# Patient Record
Sex: MALE | Race: WHITE | NOT HISPANIC OR LATINO | URBAN - METROPOLITAN AREA
[De-identification: names, ages, dates, MRNs, and addresses within clinical notes are randomized per-mention and may not be internally consistent; named-entity substitution may affect disease eponyms.]

---

## 2024-11-21 ENCOUNTER — OFFICE VISIT (OUTPATIENT)
Dept: SURGERY | Facility: CLINIC | Age: 54
End: 2024-11-21
Payer: COMMERCIAL

## 2024-11-21 VITALS — DIASTOLIC BLOOD PRESSURE: 88 MMHG | OXYGEN SATURATION: 98 % | SYSTOLIC BLOOD PRESSURE: 128 MMHG | HEART RATE: 57 BPM

## 2024-11-21 DIAGNOSIS — K40.20 NON-RECURRENT BILATERAL INGUINAL HERNIA WITHOUT OBSTRUCTION OR GANGRENE: Primary | ICD-10-CM

## 2024-11-21 PROCEDURE — 99213 OFFICE O/P EST LOW 20 MIN: CPT | Performed by: SURGERY

## 2024-11-21 PROCEDURE — 1036F TOBACCO NON-USER: CPT | Performed by: SURGERY

## 2024-11-21 RX ORDER — CANDESARTAN 32 MG/1
TABLET ORAL
COMMUNITY
Start: 2021-02-25

## 2024-11-21 RX ORDER — METOPROLOL SUCCINATE 25 MG/1
25 TABLET, EXTENDED RELEASE ORAL
COMMUNITY
Start: 2021-02-25

## 2024-11-21 RX ORDER — TRAMADOL HYDROCHLORIDE 50 MG/1
TABLET ORAL
COMMUNITY

## 2024-11-21 RX ORDER — ATORVASTATIN CALCIUM 20 MG/1
TABLET, FILM COATED ORAL
COMMUNITY
Start: 2021-02-25

## 2024-11-21 NOTE — PROGRESS NOTES
Subjective   Patient ID: John Alarcon is a 54 y.o. male who presents for Hernia (L inguinal x a week, pain w/ROM).    HPI the patient developed bulge in the left inguinal area over the last few months.  Gradually increase in size, also complains on the smaller bulge on the right side.  The patient had a previous history of back surgery with anterior approach on the left side.  Review of Systems GI consistent with a bulge in the left inguinal area.  Review of [place] system is negative pupils equal bilaterally, oral mucosa moist, bilateral breath sounds, regular rate, abdomen soft, mild tenderness left lower quadrant.  Palpable reducible large  Physical Exam inguinal hernia.  Scar from previous anterior approach for a back surgery.  Palpable smaller reducible right inguinal hernia.  No focal neurological motor deficits.  Musculoskeletal exam within normal limits.    Objective I reviewed all available data including lab results, radiological studies, previous reports and notes.    No diagnosis found.   There is no problem list on file for this patient.     Allergies   Allergen Reactions    Levofloxacin Dizziness, GI Upset and Nausea Only    Cephalosporins Rash     keflex   diarrhea, stomach cramps    keflex    Gabapentin Rash     impotnece and bladder dysfunction    neurontin urology problems    Hydrochlorothiazide Palpitations      Medication Documentation Review Audit       Reviewed by Easton Morris MD (Physician) on 11/21/24 at 1046      Medication Order Taking? Sig Documenting Provider Last Dose Status   atorvastatin (Lipitor) 20 mg tablet [id number] Yes Take by mouth. Historical Provider, MD  Active   candesartan (Atacand) 32 mg tablet [id number] Yes Take by mouth. Historical Provider, MD  Active   metoprolol succinate XL (Toprol-XL) 25 mg 24 hr tablet [id number] Yes Take 1 tablet (25 mg) by mouth once daily. Historical Provider, MD  Active   traMADol (Ultram) 50 mg tablet [id number]  TAKE 1 TO 2 TABLETS BY  MOUTH EVERY 6 TO 8 HOURS AS NEEDED FOR PAIN Historical Provider, MD  Active                    Past Medical History:   Diagnosis Date    Personal history of other diseases of the circulatory system     History of hypertension    Personal history of other diseases of the musculoskeletal system and connective tissue     History of low back pain    Personal history of other endocrine, nutritional and metabolic disease     History of hyperlipidemia     Social History     Tobacco Use   Smoking Status Never   Smokeless Tobacco Never     No family history on file.   Past Surgical History:   Procedure Laterality Date    APPENDECTOMY  11/30/2013    Appendectomy    OTHER SURGICAL HISTORY  11/30/2013    Arthrodesis Lumbar L___       Assessment/Plan   Patient with bilateral inguinal hernia.  Previous history of anterior approach for spinal surgery.  The patient has indication for robotic, probable open bilateral inguinal repair.  The final decision about robotic versus open depends on amount of intra-abdominal adhesions on the previous anterior approach.  Risks, benefits, alternative treatment were explained to the patient.  All questions were answered.  Informed consent was obtained.    Easton Morris MD

## 2024-11-27 LAB
NON-UH HIE BUN/CREAT RATIO: 14
NON-UH HIE BUN: 14 MG/DL (ref 9–23)
NON-UH HIE CALCIUM: 9.8 MG/DL (ref 8.7–10.4)
NON-UH HIE CALCULATED OSMOLALITY: 277 MOSM/KG (ref 275–295)
NON-UH HIE CHLORIDE: 105 MMOL/L (ref 98–107)
NON-UH HIE CO2, VENOUS: 31 MMOL/L (ref 20–31)
NON-UH HIE CREATININE: 1 MG/DL (ref 0.6–1.1)
NON-UH HIE GFR AA: >60
NON-UH HIE GLOMERULAR FILTRATION RATE: >60 ML/MIN/1.73M?
NON-UH HIE GLUCOSE: 71 MG/DL (ref 74–106)
NON-UH HIE HCT: 45.2 % (ref 41–52)
NON-UH HIE HGB: 15.2 G/DL (ref 13.5–17.5)
NON-UH HIE INSTR WBC ND: 7
NON-UH HIE K: 4.5 MMOL/L (ref 3.5–5.1)
NON-UH HIE MCH: 29.1 PG (ref 27–34)
NON-UH HIE MCHC: 33.7 G/DL (ref 32–37)
NON-UH HIE MCV: 86.4 FL (ref 80–100)
NON-UH HIE MPV: 9.1 FL (ref 7.4–10.4)
NON-UH HIE NA: 139 MMOL/L (ref 135–145)
NON-UH HIE PLATELET: 201 X10 (ref 150–450)
NON-UH HIE RBC: 5.23 X10 (ref 4.7–6.1)
NON-UH HIE RDW: 13.1 % (ref 11.5–14.5)
NON-UH HIE WBC: 7 X10 (ref 4.5–11)

## 2024-12-30 ENCOUNTER — APPOINTMENT (OUTPATIENT)
Dept: SURGERY | Facility: CLINIC | Age: 54
End: 2024-12-30
Payer: COMMERCIAL

## 2024-12-30 DIAGNOSIS — K40.20 NON-RECURRENT BILATERAL INGUINAL HERNIA WITHOUT OBSTRUCTION OR GANGRENE: Primary | ICD-10-CM

## 2024-12-30 PROCEDURE — 99024 POSTOP FOLLOW-UP VISIT: CPT | Performed by: SURGERY

## 2024-12-30 PROCEDURE — 1036F TOBACCO NON-USER: CPT | Performed by: SURGERY

## 2024-12-30 NOTE — PROGRESS NOTES
Subjective   Patient ID: John Alarcon is a 54 y.o. male who presents for Post-op ([facility] 12/4).  No complaints    HPI bilateral robotic inguinal hernia repair  Review of Systems  Physical Exam    Objective     No diagnosis found.   There is no problem list on file for this patient.     Allergies   Allergen Reactions    Levofloxacin Dizziness, GI Upset and Nausea Only    Cephalosporins Rash     keflex   diarrhea, stomach cramps    keflex    Gabapentin Rash     impotnece and bladder dysfunction    neurontin urology problems    Hydrochlorothiazide Palpitations      Medication Documentation Review Audit       Reviewed by Easton Morris MD (Physician) on 12/30/24 at 1135      Medication Order Taking? Sig Documenting Provider Last Dose Status   atorvastatin (Lipitor) 20 mg tablet [id number]  Take by mouth. Historical Provider, MD  Active   candesartan (Atacand) 32 mg tablet [id number]  Take by mouth. Historical Provider, MD  Active   metoprolol succinate XL (Toprol-XL) 25 mg 24 hr tablet [id number]  Take 1 tablet (25 mg) by mouth once daily. Historical Provider, MD  Active   traMADol (Ultram) 50 mg tablet [id number]  TAKE 1 TO 2 TABLETS BY MOUTH EVERY 6 TO 8 HOURS AS NEEDED FOR PAIN Historical Provider, MD  Active                    Past Medical History:   Diagnosis Date    Personal history of other diseases of the circulatory system     History of hypertension    Personal history of other diseases of the musculoskeletal system and connective tissue     History of low back pain    Personal history of other endocrine, nutritional and metabolic disease     History of hyperlipidemia     Social History     Tobacco Use   Smoking Status Never   Smokeless Tobacco Never     No family history on file.   Past Surgical History:   Procedure Laterality Date    APPENDECTOMY  11/30/2013    Appendectomy    OTHER SURGICAL HISTORY  11/30/2013    Arthrodesis Lumbar L___       Assessment/Plan     Status post robotic bilateral inguinal hernia  repair.  No complications.  Recommended follow-up.    Easton Morris MD